# Patient Record
Sex: MALE | Race: WHITE | NOT HISPANIC OR LATINO | URBAN - METROPOLITAN AREA
[De-identification: names, ages, dates, MRNs, and addresses within clinical notes are randomized per-mention and may not be internally consistent; named-entity substitution may affect disease eponyms.]

---

## 2018-05-09 ENCOUNTER — FOLLOW UP ESTABLISHED (OUTPATIENT)
Dept: URBAN - METROPOLITAN AREA CLINIC 60 | Facility: CLINIC | Age: 68
End: 2018-05-09
Payer: MEDICARE

## 2018-05-09 DIAGNOSIS — H31.011 MACULA SCARS OF POSTERIOR POLE (POST TRAUMATIC), RIGHT EYE: ICD-10-CM

## 2018-05-09 DIAGNOSIS — H25.043 POSTERIOR SUBCAPSULAR POLAR AGE-RELATED CATARACT, BILATERAL: Primary | ICD-10-CM

## 2018-05-09 DIAGNOSIS — H52.4 PRESBYOPIA: ICD-10-CM

## 2018-05-09 DIAGNOSIS — H40.013 OPEN ANGLE WITH BORDERLINE FINDINGS, LOW RISK, BILATERAL: ICD-10-CM

## 2018-05-09 PROCEDURE — 92014 COMPRE OPH EXAM EST PT 1/>: CPT | Performed by: OPHTHALMOLOGY

## 2018-05-09 ASSESSMENT — INTRAOCULAR PRESSURE
OS: 22
OD: 16

## 2018-05-09 ASSESSMENT — VISUAL ACUITY
OS: 20/40
OD: 20/200

## 2019-08-14 ENCOUNTER — FOLLOW UP ESTABLISHED (OUTPATIENT)
Dept: URBAN - METROPOLITAN AREA CLINIC 60 | Facility: CLINIC | Age: 69
End: 2019-08-14
Payer: COMMERCIAL

## 2019-08-14 PROCEDURE — 92014 COMPRE OPH EXAM EST PT 1/>: CPT | Performed by: OPHTHALMOLOGY

## 2019-08-14 ASSESSMENT — VISUAL ACUITY
OD: 20/200
OS: 20/25

## 2019-08-14 ASSESSMENT — INTRAOCULAR PRESSURE
OD: 16
OS: 16

## 2020-08-14 ENCOUNTER — NEW PATIENT (OUTPATIENT)
Dept: URBAN - METROPOLITAN AREA CLINIC 60 | Facility: CLINIC | Age: 70
End: 2020-08-14
Payer: COMMERCIAL

## 2020-08-14 DIAGNOSIS — D31.01 BENIGN NEOPLASM OF RIGHT CONJUNCTIVA: ICD-10-CM

## 2020-08-14 PROCEDURE — 92004 COMPRE OPH EXAM NEW PT 1/>: CPT | Performed by: OPTOMETRIST

## 2020-08-14 ASSESSMENT — INTRAOCULAR PRESSURE
OS: 13
OD: 12

## 2021-08-17 ENCOUNTER — OFFICE VISIT (OUTPATIENT)
Dept: URBAN - METROPOLITAN AREA CLINIC 60 | Facility: CLINIC | Age: 71
End: 2021-08-17
Payer: COMMERCIAL

## 2021-08-17 DIAGNOSIS — H25.13 AGE-RELATED NUCLEAR CATARACT, BILATERAL: Primary | ICD-10-CM

## 2021-08-17 PROCEDURE — 92014 COMPRE OPH EXAM EST PT 1/>: CPT | Performed by: OPTOMETRIST

## 2021-08-17 ASSESSMENT — VISUAL ACUITY
OD: 20/150
OS: 20/20

## 2021-08-17 ASSESSMENT — INTRAOCULAR PRESSURE
OD: 14
OS: 16

## 2021-08-17 NOTE — IMPRESSION/PLAN
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 Plan: Patient educated. Reviewed last external photos done and last notes from [facility] Whelan (02/14/2020). Per patient he will be seeing Dr. Bernie Bacon every 2 years.

## 2022-08-18 ENCOUNTER — OFFICE VISIT (OUTPATIENT)
Dept: URBAN - METROPOLITAN AREA CLINIC 60 | Facility: CLINIC | Age: 72
End: 2022-08-18
Payer: COMMERCIAL

## 2022-08-18 DIAGNOSIS — H40.013 OPEN ANGLE WITH BORDERLINE FINDINGS, LOW RISK, BILATERAL: ICD-10-CM

## 2022-08-18 DIAGNOSIS — H43.393 OTHER VITREOUS OPACITIES, BILATERAL: ICD-10-CM

## 2022-08-18 DIAGNOSIS — D31.01 BENIGN NEOPLASM OF RIGHT CONJUNCTIVA: ICD-10-CM

## 2022-08-18 DIAGNOSIS — H25.13 AGE-RELATED NUCLEAR CATARACT, BILATERAL: Primary | ICD-10-CM

## 2022-08-18 DIAGNOSIS — H31.011 MACULA SCARS OF POSTERIOR POLE (POSTINFLAMMATORY) (POST-TRAUMATIC), RIGHT EYE: ICD-10-CM

## 2022-08-18 PROCEDURE — 92014 COMPRE OPH EXAM EST PT 1/>: CPT | Performed by: OPTOMETRIST

## 2022-08-18 ASSESSMENT — INTRAOCULAR PRESSURE
OD: 16
OS: 18

## 2022-08-18 NOTE — IMPRESSION/PLAN
Impression: Congenital ONH asymmetry Plan: IOP continues to be stable. Patient educated on findings.

## 2023-03-28 NOTE — IMPRESSION/PLAN
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 Plan: Patient educated. Reviewed last external photos done and last notes from Mar[facility] Whelan (02/14/2020). Patient to continue care with Dr. Mesha Uribe. Patient

## 2023-08-18 ENCOUNTER — OFFICE VISIT (OUTPATIENT)
Dept: URBAN - METROPOLITAN AREA CLINIC 60 | Facility: CLINIC | Age: 73
End: 2023-08-18
Payer: COMMERCIAL

## 2023-08-18 DIAGNOSIS — H43.393 OTHER VITREOUS OPACITIES, BILATERAL: ICD-10-CM

## 2023-08-18 DIAGNOSIS — H25.13 AGE-RELATED NUCLEAR CATARACT, BILATERAL: Primary | ICD-10-CM

## 2023-08-18 DIAGNOSIS — H31.011 MACULA SCARS OF POSTERIOR POLE (POST TRAUMATIC), RIGHT EYE: ICD-10-CM

## 2023-08-18 DIAGNOSIS — H40.013 OPEN ANGLE WITH BORDERLINE FINDINGS, LOW RISK, BILATERAL: ICD-10-CM

## 2023-08-18 PROCEDURE — 92014 COMPRE OPH EXAM EST PT 1/>: CPT | Performed by: OPTOMETRIST

## 2023-08-18 ASSESSMENT — INTRAOCULAR PRESSURE
OS: 16
OD: 17

## 2024-08-19 ENCOUNTER — OFFICE VISIT (OUTPATIENT)
Dept: URBAN - METROPOLITAN AREA CLINIC 60 | Facility: CLINIC | Age: 74
End: 2024-08-19
Payer: COMMERCIAL

## 2024-08-19 DIAGNOSIS — H31.011 MACULA SCARS OF POSTERIOR POLE (POST TRAUMATIC), RIGHT EYE: ICD-10-CM

## 2024-08-19 DIAGNOSIS — H40.013 OPEN ANGLE WITH BORDERLINE FINDINGS, LOW RISK, BILATERAL: ICD-10-CM

## 2024-08-19 DIAGNOSIS — H25.13 AGE-RELATED NUCLEAR CATARACT, BILATERAL: Primary | ICD-10-CM

## 2024-08-19 PROCEDURE — 92014 COMPRE OPH EXAM EST PT 1/>: CPT | Performed by: OPTOMETRIST

## 2024-08-19 ASSESSMENT — VISUAL ACUITY
OD: 20/100
OS: 20/25

## 2024-08-19 ASSESSMENT — INTRAOCULAR PRESSURE
OD: 10
OS: 14

## 2025-08-19 ENCOUNTER — OFFICE VISIT (OUTPATIENT)
Dept: URBAN - METROPOLITAN AREA CLINIC 60 | Facility: CLINIC | Age: 75
End: 2025-08-19
Payer: COMMERCIAL

## 2025-08-19 DIAGNOSIS — H31.011 MACULA SCARS OF POSTERIOR POLE (POST TRAUMATIC), RIGHT EYE: ICD-10-CM

## 2025-08-19 DIAGNOSIS — H40.013 OPEN ANGLE WITH BORDERLINE FINDINGS, LOW RISK, BILATERAL: ICD-10-CM

## 2025-08-19 DIAGNOSIS — H25.813 COMBINED FORMS OF AGE-RELATED CATARACT, BILATERAL: Primary | ICD-10-CM

## 2025-08-19 PROCEDURE — 92015 DETERMINE REFRACTIVE STATE: CPT | Performed by: OPTOMETRIST

## 2025-08-19 PROCEDURE — 92014 COMPRE OPH EXAM EST PT 1/>: CPT | Performed by: OPTOMETRIST

## 2025-08-19 PROCEDURE — 92133 CPTRZD OPH DX IMG PST SGM ON: CPT | Performed by: OPTOMETRIST

## 2025-08-19 ASSESSMENT — VISUAL ACUITY
OD: 20/150
OS: 20/20

## 2025-08-19 ASSESSMENT — INTRAOCULAR PRESSURE
OD: 15
OS: 18